# Patient Record
Sex: MALE | Race: WHITE | Employment: FULL TIME | URBAN - METROPOLITAN AREA
[De-identification: names, ages, dates, MRNs, and addresses within clinical notes are randomized per-mention and may not be internally consistent; named-entity substitution may affect disease eponyms.]

---

## 2017-11-01 ENCOUNTER — HOSPITAL ENCOUNTER (OUTPATIENT)
Dept: PHYSICAL THERAPY | Age: 62
Discharge: HOME | End: 2017-11-01
Payer: COMMERCIAL

## 2017-11-01 PROCEDURE — 97165 OT EVAL LOW COMPLEX 30 MIN: CPT

## 2017-11-01 PROCEDURE — 97110 THERAPEUTIC EXERCISES: CPT

## 2017-11-01 NOTE — PROGRESS NOTES
ONIEL Underwood [address] NOTE 4-16    Patient Name: Inez Davis  Date:2017  : 1955  [x]  Patient  Verified  Payor: [employer] / Plan: [address] [place] [address] / Product Type: PPO /    In time:1:00  Out time: 1:45  Total Treatment Time (min): 45  Visit #: 1 of 8    Treatment Area: Left arm pain [M79.602]    SUBJECTIVE  Pain Level (0-10 scale): 0/10  Any medication changes, allergies to medications, adverse drug reactions, diagnosis change, or new procedure performed?: [x] No    [] Yes (see summary sheet for update)  Subjective functional status/changes:   [x] See paper eval form in chart    OBJECTIVE  30- Eval    15 min Therapeutic Exercise: see flow sheet    Rationale: increase ROM  to improve the patients ability to grasp, reach and perform ADLs. With   [x] TE   [] TA   [] neuro   [] other: Patient Education: [x] Review HEP    [] Progressed/Changed HEP based on:   [x] positioning   [] body mechanics   [] transfers   [x] heat/ice application   [] Splint wear/care   [] Sensory re-education   [] scar management      [] other:         Pain Level (0-10 scale) post treatment: 0/10    ASSESSMENT/Changes in Function:      [x]  See Plan of Care         Patient Goals:  Short Term Goals: To be accomplished in 2  weeks:  Goal:* Patient will be compliant with initial home exercise program to take an active role in their rehabilitation process. Status at eval:  HEP initiated     Long Term Goals: To be accomplished in 4 weeks:  Goal:*Patient will be able to oppose to all digits with no difficulties in order to grasp objects. Status at eval: to digits I and II only    Goal:* Patient will report no pain or discomfort with left shoulder ROM in order to reach in cupboard for items. Status at Eval: stiffness and discomfort.      PLAN  [x]  Upgrade activities as tolerated     [x]  Continue plan of care  []  Update interventions per flow sheet       []  Discharge due to:_  []  Other:_      Max Donald OT 11/1/2017  2:23 PM

## 2017-11-01 NOTE — PROGRESS NOTES
[facility]  [address], [address]  Ph [phone]  Fx [phone]    Plan of Care/Statement of Necessity for Occupational Therapy Services          Patient name: Temo Galeano Start of Care: 2017   Referral source: Lazarus Rattan, MD : 1955    Medical Diagnosis: Left arm pain [M79.602]   Onset Date:2017    Treatment Diagnosis: Left Elbow Pain   Prior Hospitalization: see medical history Provider#: [id number]   Medications: Verified on Patient summary List    Comorbidities: heart disease    Prior Level of Function: Indep with ADLs and IADLs. The Plan of Care and following information is based on the information from the initial evaluation. Assessment/ key information: Patient is a 60year old male who presents s/p Left radius fracture ORIF on 2017. Patient reports no pain today and stiffness overall at left shoulder and digits. Patient demonstrates moderate edema overall. He will benefit from therapy to address ROM and strength per protocol. Evaluation Complexity: History LOW Complexity : Brief history review  Examination LOW Complexity : 1-3 performance deficits relating to physical, cognitive , or psychosocial skils that result in activity limitations and / or participation restrictions  Clinical Decision Making MEDIUM Complexity : Patient may present with comorbidities that affect occupational performnce.  Miniml to moderate modification of tasks or assistance (eg, physical or verbal ) with assesment(s) is necessary to enable patient to complete evaluation   Overall Complexity Rating: LOW     Problem List: Pain effecting function, Decreased range of motion, Decreased strength and Decreased ADL/functional abilities      Treatment Plan may include any combination of the following: Therapeutic exercise, Therapeutic activities, Physical agent/modality, Scar management, Manual therapy, Splinting/orthoses, Patient education and ADLs/IADLs    Patient / Family readiness to learn indicated by: asking questions and trying to perform skills    Persons(s) to be included in education:   patient (P)    Barriers to Learning/Limitations: None    Patient Goal (s): To move my arm again.     Patient Self Reported Health Status: good    Rehabilitation Potential: good    Short Term Goals: To be accomplished in 2  weeks:  Goal:* Patient will be compliant with initial home exercise program to take an active role in their rehabilitation process. Status at eval:  HEP initiated      Long Term Goals: To be accomplished in 4 weeks:  Goal:*Patient will be able to oppose to all digits with no difficulties in order to grasp objects. Status at eval: to digits I and II only     Goal:* Patient will report no pain or discomfort with left shoulder ROM in order to reach in cupboard for items. Status at Eval: stiffness and discomfort. Frequency / Duration: Patient to be seen 2 times per week for 4 weeks:    Patient/ Caregiver education and instruction:Diagnosis, prognosis, self care, activity modification, brace/ splint application and exercises  [x]  Plan of care has been reviewed with [place], OT 11/1/2017 2:33 PM________________________________________________________________________    I certify that the above Therapy Services are being furnished while the patient is under my care. I agree with the treatment plan and certify that this therapy is necessary.     Physician's Signature:____________________  Date:____________Time:__________    Please sign and return to [facility]  [address], [address] [place] [address] [place]  Ph [phone]  Fx [phone]

## 2017-12-13 NOTE — PROGRESS NOTES
[facility]  [address], [address] [place]  Ph [phone]  Fx [phone]    Occupational Therapy Discharge Summary      Patient name: Wero Harrington Start of Care: 17   Referral source: Panfilo Das MD : 1955   Medical/Treatment Diagnosis: Left arm pain [M79.602] Onset Date:2017     Prior Hospitalization: see medical history Provider#: [id number]   Medications: Verified on Patient Summary List    Comorbidities: heart disease  Prior Level of Function:indep with ADLs and IADLs    Visits from Start of Care: 1    Missed Visits:  0    Summary of Care:    Patient attended OT for Initial Evaluation only due to unknown reasons. Issued HEP but unable to address goals. Short Term Goals: To be accomplished in 2  weeks:  Goal:* Patient will be compliant with initial home exercise program to take an active role in their rehabilitation process. Status at eval:  HEP initiated       Long Term Goals: To be accomplished in 4 weeks:  Goal:*Patient will be able to oppose to all digits with no difficulties in order to grasp objects. Status at eval: to digits I and II only      Goal:* Patient will report no pain or discomfort with left shoulder ROM in order to reach in cupboard for items. Status at Eval: stiffness and discomfort.      ASSESSMENT/RECOMMENDATIONS:  [x]Discontinue therapy: []Patient has reached or is progressing toward set goals      [x]Patient is non-compliant or has abdicated      []Due to lack of appreciable progress towards set [address], OT 2017 1:52 PM